# Patient Record
Sex: MALE | Race: WHITE | NOT HISPANIC OR LATINO
[De-identification: names, ages, dates, MRNs, and addresses within clinical notes are randomized per-mention and may not be internally consistent; named-entity substitution may affect disease eponyms.]

---

## 2019-06-16 ENCOUNTER — TRANSCRIPTION ENCOUNTER (OUTPATIENT)
Age: 79
End: 2019-06-16

## 2020-10-30 ENCOUNTER — TRANSCRIPTION ENCOUNTER (OUTPATIENT)
Age: 80
End: 2020-10-30

## 2020-12-26 ENCOUNTER — TRANSCRIPTION ENCOUNTER (OUTPATIENT)
Age: 80
End: 2020-12-26

## 2022-02-27 ENCOUNTER — OUTPATIENT (OUTPATIENT)
Dept: OUTPATIENT SERVICES | Facility: HOSPITAL | Age: 82
LOS: 1 days | End: 2022-02-27
Payer: MEDICARE

## 2022-02-27 DIAGNOSIS — Z20.828 CONTACT WITH AND (SUSPECTED) EXPOSURE TO OTHER VIRAL COMMUNICABLE DISEASES: ICD-10-CM

## 2022-02-27 LAB — SARS-COV-2 RNA SPEC QL NAA+PROBE: SIGNIFICANT CHANGE UP

## 2022-02-27 PROCEDURE — U0005: CPT

## 2022-02-27 PROCEDURE — U0003: CPT

## 2022-03-01 ENCOUNTER — EMERGENCY (EMERGENCY)
Facility: HOSPITAL | Age: 82
LOS: 1 days | Discharge: HOME | End: 2022-03-01
Attending: EMERGENCY MEDICINE | Admitting: EMERGENCY MEDICINE
Payer: MEDICARE

## 2022-03-01 VITALS
WEIGHT: 205.91 LBS | TEMPERATURE: 97 F | SYSTOLIC BLOOD PRESSURE: 100 MMHG | RESPIRATION RATE: 16 BRPM | HEART RATE: 50 BPM | DIASTOLIC BLOOD PRESSURE: 52 MMHG | HEIGHT: 67 IN | OXYGEN SATURATION: 93 %

## 2022-03-01 VITALS
TEMPERATURE: 97 F | SYSTOLIC BLOOD PRESSURE: 106 MMHG | HEART RATE: 50 BPM | RESPIRATION RATE: 17 BRPM | DIASTOLIC BLOOD PRESSURE: 6 MMHG | OXYGEN SATURATION: 95 %

## 2022-03-01 PROCEDURE — 36415 COLL VENOUS BLD VENIPUNCTURE: CPT

## 2022-03-01 PROCEDURE — 99284 EMERGENCY DEPT VISIT MOD MDM: CPT

## 2022-03-01 PROCEDURE — 80048 BASIC METABOLIC PNL TOTAL CA: CPT

## 2022-03-01 PROCEDURE — 93005 ELECTROCARDIOGRAM TRACING: CPT

## 2022-03-01 PROCEDURE — 93010 ELECTROCARDIOGRAM REPORT: CPT

## 2022-03-01 RX ORDER — PREGABALIN 225 MG/1
0 CAPSULE ORAL
Qty: 0 | Refills: 0 | DISCHARGE

## 2022-03-01 RX ORDER — MULTIVIT-MIN/FERROUS GLUCONATE 9 MG/15 ML
0 LIQUID (ML) ORAL
Qty: 0 | Refills: 0 | DISCHARGE

## 2022-03-01 RX ORDER — HYDRALAZINE HCL 50 MG
0 TABLET ORAL
Qty: 0 | Refills: 0 | DISCHARGE

## 2022-03-01 RX ORDER — ATORVASTATIN CALCIUM 80 MG/1
1 TABLET, FILM COATED ORAL
Qty: 0 | Refills: 0 | DISCHARGE

## 2022-03-01 RX ORDER — WARFARIN SODIUM 2.5 MG/1
1 TABLET ORAL
Qty: 0 | Refills: 0 | DISCHARGE

## 2022-03-01 RX ORDER — CHOLECALCIFEROL (VITAMIN D3) 125 MCG
1000 CAPSULE ORAL
Qty: 0 | Refills: 0 | DISCHARGE

## 2022-03-01 NOTE — ED PROVIDER NOTE - NSFOLLOWUPINSTRUCTIONS_ED_ALL_ED_FT
Bradycardia, Adult      Bradycardia is a slower-than-normal heartbeat. A normal resting heart rate for an adult ranges from 60 to 100 beats per minute. With bradycardia, the resting heart rate is less than 60 beats per minute.    Bradycardia can prevent enough oxygen from reaching certain areas of your body when you are active. It can be serious if it keeps enough oxygen from reaching your brain and other parts of your body. Bradycardia is not a problem for everyone. For some healthy adults, a slow resting heart rate is normal.      What are the causes?     This condition may be caused by:•A problem with the heart, including:  •A problem with the heart's electrical system, such as a heart block. With a heart block, electrical signals between the chambers of the heart are partially or completely blocked, so they are not able to work as they should.      •A problem with the heart's natural pacemaker (sinus node).      •Heart disease.      •A heart attack.      •Heart damage.      •Lyme disease.      •A heart infection.      •A heart condition that is present at birth (congenital heart defect).        •Certain medicines that treat heart conditions.      •Certain conditions, such as hypothyroidism and obstructive sleep apnea.      •Problems with the balance of chemicals and other substances, like potassium, in the blood.      •Trauma.      •Radiation therapy.        What increases the risk?    You are more likely to develop this condition if you:  •Are age 65 or older.      •Have high blood pressure (hypertension), high cholesterol (hyperlipidemia), or diabetes.      •Drink heavily, use tobacco or nicotine products, or use drugs.        What are the signs or symptoms?    Symptoms of this condition include:  •Light-headedness.      •Feeling faint or fainting.      •Fatigue and weakness.      •Trouble with activity or exercise.      •Shortness of breath.      •Chest pain (angina).      •Drowsiness.      •Confusion.      •Dizziness.        How is this diagnosed?    This condition may be diagnosed based on:  •Your symptoms.      •Your medical history.      •A physical exam.      During the exam, your health care provider will listen to your heartbeat and check your pulse. To confirm the diagnosis, your health care provider may order tests, such as:  •Blood tests.      •An electrocardiogram (ECG). This test records the heart's electrical activity. The test can show how fast your heart is beating and whether the heartbeat is steady.      •A test in which you wear a portable device (event recorder or Holter monitor) to record your heart's electrical activity while you go about your day.      •An exercise test.        How is this treated?    Treatment for this condition depends on the cause of the condition and how severe your symptoms are. Treatment may involve:  •Treatment of the underlying condition.      •Changing your medicines or how much medicine you take.      •Having a small, battery-operated device called a pacemaker implanted under the skin. When bradycardia occurs, this device can be used to increase your heart rate and help your heart beat in a regular rhythm.        Follow these instructions at home:      Lifestyle      •Manage any health conditions that contribute to bradycardia as told by your health care provider.      •Follow a heart-healthy diet. A nutrition specialist (dietitian) can help educate you about healthy food options and changes.      •Follow an exercise program that is approved by your health care provider.      •Maintain a healthy weight.      •Try to reduce or manage your stress, such as with yoga or meditation. If you need help reducing stress, ask your health care provider.      • Do not use any products that contain nicotine or tobacco, such as cigarettes, e-cigarettes, and chewing tobacco. If you need help quitting, ask your health care provider.      • Do not use illegal drugs.      •Limit alcohol intake to no more than 1 drink a day for nonpregnant women and 2 drinks a day for men. Be aware of how much alcohol is in your drink. In the [place] one drink equals one 12 oz bottle of beer (355 mL), one 5 oz glass of wine (148 mL), or one 1½ oz glass of hard liquor (44 mL).      General instructions     •Take over-the-counter and prescription medicines only as told by your health care provider.      •Keep all follow-up visits as told by your health care provider. This is important.        How is this prevented?    In some cases, bradycardia may be prevented by:  •Treating underlying medical problems.      •Stopping behaviors or medicines that can trigger the condition.        Contact a health care provider if you:    •Feel light-headed or dizzy.      •Almost faint.      •Feel weak or are easily fatigued during physical activity.      •Experience confusion or have memory problems.        Get help right away if:    •You faint.    •You have:  •An irregular heartbeat (palpitations).      •Chest pain.      •Trouble breathing.          Summary    •Bradycardia is a slower-than-normal heartbeat. With bradycardia, the resting heart rate is less than 60 beats per minute.      •Treatment for this condition depends on the cause.      •Manage any health conditions that contribute to bradycardia as told by your health care provider.      • Do not use any products that contain nicotine or tobacco, such as cigarettes, e-cigarettes, and chewing tobacco, and limit alcohol intake.      •Keep all follow-up visits as told by your health care provider. This is important.      This information is not intended to replace advice given to you by your health care provider. Make sure you discuss any questions you have with your health care provider.

## 2022-03-01 NOTE — ED PROVIDER NOTE - MUSCULOSKELETAL, MLM
Spine appears normal, range of motion is not limited, no muscle or joint tenderness. AV fistula in left upper arm. +thrill.

## 2022-03-01 NOTE — ED ADULT NURSE NOTE - OBJECTIVE STATEMENT
AAOx3 states he was at pre-operation today and while monitoring, patient's HR dropped to 33 BPM. Patient denies any complaints, denies dizziness, denies pain, denies SOB. On cardiac monitor with . EKG completed. MD evans completed. Awaiting order. 22G to right forearm placed at pre-op. Safety maintained. Walker at bedside.

## 2022-03-01 NOTE — ED PROVIDER NOTE - CLINICAL SUMMARY MEDICAL DECISION MAKING FREE TEXT BOX
Preop eye case with chronic afib and slow vent response. Pt asymptomatic. Todays electrolytes normal. Plan - Cardio consult. May need further outpt cardio eval for preop clearance.

## 2022-03-01 NOTE — CONSULT NOTE ADULT - SUBJECTIVE AND OBJECTIVE BOX
History of Present Illness: The patient is an 82 year old male with a history of HTN, HL, chronic atrial fibrillation, TAVR, CAD s/p CABG, ESRD on HD who presents with bradycardia. He was scheduled for eye surgery today but it was cancelled due to bradycardia. He was sent to the ED. He has no complaints. He denies dizziness, palpitations, shortness of breath, chest pain. He states his heart rates chronically run in the mid 40s.    Past Medical/Surgical History:  HTN, HL, chronic atrial fibrillation, TAVR, CAD s/p CABG, ESRD on HD    Medications:  Home Medications:  atorvastatin 40 mg oral tablet: 1 tab(s) orally once a day (01 Mar 2022 08:48)  Coumadin 5 mg oral tablet: 1 tab(s) orally once a day- 5 days a week (01 Mar 2022 08:48)  Coumadin 7.5 mg oral tablet: 1 tab(s) orally once a day- 2 days a week (01 Mar 2022 08:48)  Dialyvite 3000: orally once a day (01 Mar 2022 08:48)  hydrALAZINE 10 mg oral tablet: orally 3 times a day (01 Mar 2022 08:48)  Vitamin B12: orally once a day (01 Mar 2022 08:48)  Vitamin D3: 1000 international unit(s) orally once a day (01 Mar 2022 08:48)      Family History: Non-contributory family history of premature cardiovascular atherosclerotic disease    Social History: No tobacco, alcohol or drug use    Review of Systems:  General: No fevers, chills, weight gain  Skin: No rashes, color changes  Cardiovascular: No chest pain, orthopnea  Respiratory: No shortness of breath, cough  Gastrointestinal: No nausea, abdominal pain  Genitourinary: No incontinence, pain with urination  Musculoskeletal: No pain, swelling, decreased range of motion  Neurological: No headache, weakness  Psychiatric: No depression, anxiety  Endocrine: No weight gain, increased thirst  All other systems are comprehensively negative.    Physical Exam:  Vitals:        Vital Signs Last 24 Hrs  T(C): 36.3 (01 Mar 2022 08:35), Max: 36.3 (01 Mar 2022 08:35)  T(F): 97.4 (01 Mar 2022 08:35), Max: 97.4 (01 Mar 2022 08:35)  HR: 50 (01 Mar 2022 08:35) (40 - 50)  BP: 100/52 (01 Mar 2022 08:35) (100/52 - 100/52)  BP(mean): --  RR: 16 (01 Mar 2022 08:35) (16 - 22)  SpO2: 93% (01 Mar 2022 08:35) (93% - 93%)  General: NAD  HEENT: MMM  Neck: No JVD, no carotid bruit  Lungs: CTAB  CV: Irregular, nl S1/S2, no M/R/G  Abdomen: S/NT/ND, +BS  Extremities: No LE edema, no cyanosis  Neuro: AAOx3, non-focal  Skin: No rash    Labs:    03-01    136  |  96  |  43<H>  ----------------------------<  108<H>  5.1   |  31  |  5.46<H>    Ca    9.1      01 Mar 2022 08:01              ECG: AF, normal axis, LVH with secondary repolarization abnormality

## 2022-03-01 NOTE — ED ADULT NURSE NOTE - NSIMPLEMENTINTERV_GEN_ALL_ED
Implemented All Fall Risk Interventions:  [place] to call system. Call bell, personal items and telephone within reach. Instruct patient to call for assistance. Room bathroom lighting operational. Non-slip footwear when patient is off stretcher. Physically safe environment: no spills, clutter or unnecessary equipment. Stretcher in lowest position, wheels locked, appropriate side rails in place. Provide visual cue, wrist band, yellow gown, etc. Monitor gait and stability. Monitor for mental status changes and reorient to person, place, and time. Review medications for side effects contributing to fall risk. Reinforce activity limits and safety measures with patient and family.

## 2022-03-01 NOTE — CONSULT NOTE ADULT - ASSESSMENT
The patient is an 82 year old male with a history of HTN, HL, chronic atrial fibrillation, TAVR, CAD s/p CABG, ESRD on HD who presents with bradycardia.    Plan:  - ECG and telemetry consistent with slow atrial fibrillation in the mid 40s  - The patient states he normally runs in the mid 40s  - The patient is asymptomatic from a rhythm perspective  - Presumably there is some degree of heart block given bradycardia with atrial fibrillation; however given lack of symptoms there is no indication for pacemaker at the current time  - He is not on any AV shirlene blocking agents  - No further cardiac optimization indicated at this time. The patient can be discharged from a cardiac perspective. On repeat attempt at eye surgery, recommend atropine or glycopyrrolate if bradycardia persists.

## 2022-03-01 NOTE — ED PROVIDER NOTE - PATIENT PORTAL LINK FT
You can access the FollowMyHealth Patient Portal offered by [facility] by registering at the following website: [email or web address]. By joining [facility]’s FollowMyHealth portal, you will also be able to view your health information using other applications (apps) compatible with our system.

## 2022-03-01 NOTE — ED PROVIDER NOTE - OBJECTIVE STATEMENT
81 yo male with hx of afib with slow vent rate, ESRD on dialysis, sent from [facility] for eval of slow heart rate while in preop today. Was scheduled for eye surgery for retinal hemorrhage left eye. Pt denies any symptoms. Feels well. States his cardiologist has noted chronically slow heart rate and has discussed possibility of needing a pacemaker in the future.  PCP and cardio in [place]

## 2022-03-01 NOTE — ED ADULT TRIAGE NOTE - CHIEF COMPLAINT QUOTE
"I was scheduled for eye surgery & they said my pulse is too low. I have discussed this with my cardiologist"

## 2022-03-01 NOTE — ED ADULT NURSE REASSESSMENT NOTE - NS ED NURSE REASSESS COMMENT FT1
Patient denies any complaints on exit. Ambulatory with walker (baseline). Well appearing and displays no distress. Spouse assisted patient to exit.

## 2022-03-30 PROBLEM — I25.10 ATHEROSCLEROTIC HEART DISEASE OF NATIVE CORONARY ARTERY WITHOUT ANGINA PECTORIS: Chronic | Status: ACTIVE | Noted: 2022-03-01

## 2022-03-30 PROBLEM — I48.20 CHRONIC ATRIAL FIBRILLATION, UNSPECIFIED: Chronic | Status: ACTIVE | Noted: 2022-03-01

## 2022-04-03 ENCOUNTER — OUTPATIENT (OUTPATIENT)
Dept: OUTPATIENT SERVICES | Facility: HOSPITAL | Age: 82
LOS: 1 days | End: 2022-04-03
Payer: MEDICARE

## 2022-04-03 DIAGNOSIS — Z20.828 CONTACT WITH AND (SUSPECTED) EXPOSURE TO OTHER VIRAL COMMUNICABLE DISEASES: ICD-10-CM

## 2022-04-03 LAB — SARS-COV-2 RNA SPEC QL NAA+PROBE: SIGNIFICANT CHANGE UP

## 2022-04-03 PROCEDURE — U0003: CPT

## 2022-04-03 PROCEDURE — U0005: CPT

## 2022-04-04 ENCOUNTER — TRANSCRIPTION ENCOUNTER (OUTPATIENT)
Age: 82
End: 2022-04-04

## 2022-04-05 ENCOUNTER — OUTPATIENT (OUTPATIENT)
Dept: OUTPATIENT SERVICES | Facility: HOSPITAL | Age: 82
LOS: 1 days | End: 2022-04-05
Payer: MEDICARE

## 2022-04-05 ENCOUNTER — TRANSCRIPTION ENCOUNTER (OUTPATIENT)
Age: 82
End: 2022-04-05

## 2022-04-05 VITALS
RESPIRATION RATE: 16 BRPM | SYSTOLIC BLOOD PRESSURE: 96 MMHG | OXYGEN SATURATION: 99 % | DIASTOLIC BLOOD PRESSURE: 64 MMHG | HEART RATE: 62 BPM

## 2022-04-05 VITALS
WEIGHT: 212.53 LBS | SYSTOLIC BLOOD PRESSURE: 107 MMHG | DIASTOLIC BLOOD PRESSURE: 64 MMHG | TEMPERATURE: 97 F | HEIGHT: 66 IN | OXYGEN SATURATION: 97 % | RESPIRATION RATE: 14 BRPM | HEART RATE: 61 BPM

## 2022-04-05 DIAGNOSIS — H43.12 VITREOUS HEMORRHAGE, LEFT EYE: ICD-10-CM

## 2022-04-05 DIAGNOSIS — Z95.1 PRESENCE OF AORTOCORONARY BYPASS GRAFT: Chronic | ICD-10-CM

## 2022-04-05 DIAGNOSIS — Z90.89 ACQUIRED ABSENCE OF OTHER ORGANS: Chronic | ICD-10-CM

## 2022-04-05 DIAGNOSIS — Z95.2 PRESENCE OF PROSTHETIC HEART VALVE: Chronic | ICD-10-CM

## 2022-04-05 DIAGNOSIS — Z98.49 CATARACT EXTRACTION STATUS, UNSPECIFIED EYE: Chronic | ICD-10-CM

## 2022-04-05 DIAGNOSIS — I77.0 ARTERIOVENOUS FISTULA, ACQUIRED: Chronic | ICD-10-CM

## 2022-04-05 DIAGNOSIS — Q21.1 ATRIAL SEPTAL DEFECT: Chronic | ICD-10-CM

## 2022-04-05 LAB
ANION GAP SERPL CALC-SCNC: 9 MMOL/L — SIGNIFICANT CHANGE UP (ref 5–17)
BUN SERPL-MCNC: 48 MG/DL — HIGH (ref 7–23)
CALCIUM SERPL-MCNC: 9.1 MG/DL — SIGNIFICANT CHANGE UP (ref 8.4–10.5)
CHLORIDE SERPL-SCNC: 94 MMOL/L — LOW (ref 96–108)
CO2 SERPL-SCNC: 32 MMOL/L — HIGH (ref 22–31)
CREAT SERPL-MCNC: 4.87 MG/DL — HIGH (ref 0.5–1.3)
EGFR: 11 ML/MIN/1.73M2 — LOW
GLUCOSE SERPL-MCNC: 103 MG/DL — HIGH (ref 70–99)
POTASSIUM SERPL-MCNC: 4.4 MMOL/L — SIGNIFICANT CHANGE UP (ref 3.5–5.3)
POTASSIUM SERPL-SCNC: 4.4 MMOL/L — SIGNIFICANT CHANGE UP (ref 3.5–5.3)
SODIUM SERPL-SCNC: 135 MMOL/L — SIGNIFICANT CHANGE UP (ref 135–145)

## 2022-04-05 PROCEDURE — 36415 COLL VENOUS BLD VENIPUNCTURE: CPT

## 2022-04-05 PROCEDURE — 67040 LASER TREATMENT OF RETINA: CPT | Mod: AS

## 2022-04-05 PROCEDURE — C1889: CPT

## 2022-04-05 PROCEDURE — 80048 BASIC METABOLIC PNL TOTAL CA: CPT

## 2022-04-05 PROCEDURE — 67039 LASER TREATMENT OF RETINA: CPT | Mod: LT

## 2022-04-05 DEVICE — LASER PROBE 23G CONSTELLATION: Type: IMPLANTABLE DEVICE | Site: LEFT | Status: FUNCTIONAL

## 2022-04-05 NOTE — ASU PATIENT PROFILE, ADULT - FALL HARM RISK - HARM RISK INTERVENTIONS

## 2022-04-05 NOTE — ASU DISCHARGE PLAN (ADULT/PEDIATRIC) - CARE PROVIDER_API CALL
Amilcar Casiano (MD)  Ophthalmology  [address] [place] [address], [address]  [place] [address]  Phone: [phone]  Fax: [phone]  Scheduled Appointment: 04/06/2022 03:00 PM

## 2022-04-05 NOTE — ASU DISCHARGE PLAN (ADULT/PEDIATRIC) - NS MD DC FALL RISK RISK
For information on Fall & Injury Prevention, visit: [email or web address] OR  [email or web address] OR  https://www.cdc.gov/steadi/patient.html

## 2022-04-05 NOTE — ASU PATIENT PROFILE, ADULT - NSICDXPASTMEDICALHX_GEN_ALL_CORE_FT
PAST MEDICAL HISTORY:  2019 novel coronavirus disease (COVID-19) 12/2020    Atherosclerotic heart disease     Bacteremia 8/2021    CAD (coronary artery disease)     Chronic atrial fibrillation     CRF (chronic renal failure), stage 5 dialysis 3x aweek    H/O pulmonary hypertension     History of left bundle branch block (LBBB)     HLD (hyperlipidemia)     HTN (hypertension)     RBBB

## 2022-04-05 NOTE — ASU PATIENT PROFILE, ADULT - NSICDXPASTSURGICALHX_GEN_ALL_CORE_FT
PAST SURGICAL HISTORY:  AV fistula 2019    Patent foramen ovale 2017    S/P CABG (coronary artery bypass graft) 2 vessel 2016    S/P cataract surgery bilateral    S/P TAVR (transcatheter aortic valve replacement) 2017    S/P tonsillectomy

## 2022-09-10 ENCOUNTER — NON-APPOINTMENT (OUTPATIENT)
Age: 82
End: 2022-09-10

## 2025-08-04 NOTE — ASU PATIENT PROFILE, ADULT - FALL HARM RISK - ATTEMPT OOB
Xarelto guidance received from Dr Mcdermott -- pt OK to hold Xarelto 2 days prior to colonoscopy. RN will advise patient. Clearance scanned into media.   No

## (undated) DEVICE — SUT VICRYL 7-0 12" TG140-8 DA

## (undated) DEVICE — CANNULA MEDONE FLEXTIP 23G

## (undated) DEVICE — AUTO GAS FILL CONSTELLATION

## (undated) DEVICE — NDL HYPO SAFE 22G X 1.5"

## (undated) DEVICE — CONSTELLATION TOTAL PLUS PAK 23G

## (undated) DEVICE — CANNULA MEDONE FLEXTIP 25G

## (undated) DEVICE — DRAPE STERI-DRAPE INCISE 13X13"

## (undated) DEVICE — SYE-CONSTELLATION MACHINE 1003466901X: Type: DURABLE MEDICAL EQUIPMENT

## (undated) DEVICE — BLANKET WARMER LOWER ADULT

## (undated) DEVICE — Device

## (undated) DEVICE — SOL IRR BAL SALT + 500ML

## (undated) DEVICE — LIGHT SHIELD CORNEAL

## (undated) DEVICE — ELCTR BIPOLAR CORD J&J 12FT DISP

## (undated) DEVICE — GLV 7 PROTEXIS

## (undated) DEVICE — PROBE TIP DIATHERMY DISP TAPR 25GA

## (undated) DEVICE — DRSG MASTISOL